# Patient Record
Sex: MALE | Race: OTHER | URBAN - NONMETROPOLITAN AREA
[De-identification: names, ages, dates, MRNs, and addresses within clinical notes are randomized per-mention and may not be internally consistent; named-entity substitution may affect disease eponyms.]

---

## 2023-03-01 ENCOUNTER — OFFICE VISIT (OUTPATIENT)
Dept: FAMILY MEDICINE CLINIC | Age: 28
End: 2023-03-01
Payer: MEDICAID

## 2023-03-01 VITALS
WEIGHT: 152 LBS | BODY MASS INDEX: 19.51 KG/M2 | SYSTOLIC BLOOD PRESSURE: 104 MMHG | TEMPERATURE: 97.5 F | DIASTOLIC BLOOD PRESSURE: 62 MMHG | HEIGHT: 74 IN | HEART RATE: 90 BPM | OXYGEN SATURATION: 96 %

## 2023-03-01 DIAGNOSIS — K21.9 GASTROESOPHAGEAL REFLUX DISEASE WITHOUT ESOPHAGITIS: ICD-10-CM

## 2023-03-01 DIAGNOSIS — L73.9 FOLLICULITIS: ICD-10-CM

## 2023-03-01 DIAGNOSIS — I45.6 WOLFF-PARKINSON-WHITE (WPW) SYNDROME: Primary | ICD-10-CM

## 2023-03-01 PROCEDURE — 99204 OFFICE O/P NEW MOD 45 MIN: CPT | Performed by: FAMILY MEDICINE

## 2023-03-01 RX ORDER — PANTOPRAZOLE SODIUM 40 MG/1
40 TABLET, DELAYED RELEASE ORAL
Qty: 30 TABLET | Refills: 0 | Status: SHIPPED | OUTPATIENT
Start: 2023-03-01

## 2023-03-01 SDOH — ECONOMIC STABILITY: INCOME INSECURITY: HOW HARD IS IT FOR YOU TO PAY FOR THE VERY BASICS LIKE FOOD, HOUSING, MEDICAL CARE, AND HEATING?: NOT HARD AT ALL

## 2023-03-01 SDOH — ECONOMIC STABILITY: FOOD INSECURITY: WITHIN THE PAST 12 MONTHS, THE FOOD YOU BOUGHT JUST DIDN'T LAST AND YOU DIDN'T HAVE MONEY TO GET MORE.: NEVER TRUE

## 2023-03-01 SDOH — ECONOMIC STABILITY: FOOD INSECURITY: WITHIN THE PAST 12 MONTHS, YOU WORRIED THAT YOUR FOOD WOULD RUN OUT BEFORE YOU GOT MONEY TO BUY MORE.: NEVER TRUE

## 2023-03-01 SDOH — ECONOMIC STABILITY: HOUSING INSECURITY
IN THE LAST 12 MONTHS, WAS THERE A TIME WHEN YOU DID NOT HAVE A STEADY PLACE TO SLEEP OR SLEPT IN A SHELTER (INCLUDING NOW)?: NO

## 2023-03-01 ASSESSMENT — PATIENT HEALTH QUESTIONNAIRE - PHQ9
SUM OF ALL RESPONSES TO PHQ QUESTIONS 1-9: 0
1. LITTLE INTEREST OR PLEASURE IN DOING THINGS: 0
SUM OF ALL RESPONSES TO PHQ QUESTIONS 1-9: 0
SUM OF ALL RESPONSES TO PHQ QUESTIONS 1-9: 0
SUM OF ALL RESPONSES TO PHQ9 QUESTIONS 1 & 2: 0
SUM OF ALL RESPONSES TO PHQ QUESTIONS 1-9: 0
2. FEELING DOWN, DEPRESSED OR HOPELESS: 0

## 2023-03-01 NOTE — PROGRESS NOTES
Tre Maloney (:  1995) is a 33 y.o. male,New patient, here for evaluation of the following chief complaint(s):  Established New Doctor (Did not have health care for over a year. Here to establish care and for a well check. [place] the [place]. Would like a referral to see Dr. Va Leyva for skin issues. )    SUBJECTIVE    History of present illness:     Establish care    Refugee from [facility] to [address] last summer     Past medical hx:     WPW syndrome  - no medication  - no ablations in past  - couple times per yr, HR will go up to 170   - lasts a few seconds and resolves with Valsalva maneuvers   - does not have a cardiologist   - no anticoagulation     Folliculitis   - has been very difficult to manage  - present on face and scalp  - has tried antibiotics, Selenium sulfate, and Accutane   - Requesting dermatology referral    Dyspepsia    Has been going on for years  Denies abdominal pain  Reports early satiety  No dysphagia, odynophagia, nausea, emesis, hematochezia, melena  Reports weight loss but attributes to low caloric intake while in [place] during war   No NSAID or etoh use      Family history- both parents alive, states are health    Soc Hx: no tobacco or etoh use     Review of Symptoms: As per HPI. Past Medical History:   Diagnosis Date    Cady-Parkinson-White syndrome      No past surgical history on file.   Family History   Problem Relation Age of Onset    High Blood Pressure Maternal Grandmother     High Blood Pressure Paternal Grandmother      Social History     Socioeconomic History    Marital status:      Spouse name: Not on file    Number of children: Not on file    Years of education: Not on file    Highest education level: Not on file   Occupational History    Not on file   Tobacco Use    Smoking status: Never    Smokeless tobacco: Never   Substance and Sexual Activity    Alcohol use: Never    Drug use: Never    Sexual activity: Not on file   Other Topics Concern    Not on file   Social History Narrative    Not on file     Social Determinants of Health     Financial Resource Strain: Low Risk     Difficulty of Paying Living Expenses: Not hard at all   Food Insecurity: No Food Insecurity    Worried About [address] in the Last Year: Never true    [facility] in the Last Year: Never true   Transportation Needs: Unknown    Lack of Transportation (Medical): Not on file    Lack of Transportation (Non-Medical): No   Physical Activity: Not on file   Stress: Not on file   Social Connections: Not on file   Intimate Partner Violence: Not on file   Housing Stability: Unknown    Unable to Pay for Housing in the Last Year: Not on file    Number of Places Lived in the Last Year: Not on file    Unstable Housing in the Last Year: No     Patient has no known allergies. Prior to Admission medications    Medication Sig Start Date End Date Taking? Authorizing Provider   pantoprazole (PROTONIX) 40 MG tablet Take 1 tablet by mouth every morning (before breakfast) 3/1/23  Yes Kenia Devi,        OBJECTIVE     /62 (Site: Right Upper Arm, Position: Sitting, Cuff Size: Medium Adult)   Pulse 90   Temp 97.5 °F (36.4 °C) (Tympanic)   Ht 6' 1.62\" (1.87 m)   Wt 152 lb (68.9 kg)   SpO2 96%   BMI 19.72 kg/m²   General: alert and oriented, NAD  Head: normocephalic, atraumatic  Cardiovascular: No cyanosis, RRR no murmur  Respiratory: Clear to auscultation, no respiratory distress, CTAB no wheezing  Neurological: no focal neurological deficits  Psychological: normal mood and affect    ASSESSMENT/PLAN    1. Cady-Parkinson-White (WPW) syndrome  Chronic  Hemodynamically stable  -     Ambulatory referral to Cardiology    2. Folliculitis  Chronic  Refractory  -     Ambulatory referral to Dermatology     3. GERD/dyspepsia  Uncontrolled. Ddx- gastritis, GERD, biliary colic, functional  CBC, CMP, lipase  Empiric trial of PPI     Return in 6 weeks (on 4/12/2023).     An electronic signature was used to authenticate this note.     --Greer Eisenberg, DO

## 2023-03-08 ENCOUNTER — OFFICE VISIT (OUTPATIENT)
Dept: FAMILY MEDICINE CLINIC | Age: 28
End: 2023-03-08
Payer: MEDICAID

## 2023-03-08 VITALS — HEIGHT: 74 IN | WEIGHT: 152 LBS | TEMPERATURE: 98.6 F | BODY MASS INDEX: 19.51 KG/M2

## 2023-03-08 DIAGNOSIS — K21.9 GASTROESOPHAGEAL REFLUX DISEASE WITHOUT ESOPHAGITIS: ICD-10-CM

## 2023-03-08 DIAGNOSIS — L21.9 SEBORRHEIC DERMATITIS: Primary | ICD-10-CM

## 2023-03-08 LAB
ALBUMIN SERPL-MCNC: 5 G/DL (ref 3.5–4.6)
ALP BLD-CCNC: 61 U/L (ref 35–104)
ALT SERPL-CCNC: 20 U/L (ref 0–41)
ANION GAP SERPL CALCULATED.3IONS-SCNC: 18 MEQ/L (ref 9–15)
AST SERPL-CCNC: 21 U/L (ref 0–40)
BILIRUB SERPL-MCNC: 0.7 MG/DL (ref 0.2–0.7)
BUN BLDV-MCNC: 16 MG/DL (ref 6–20)
CALCIUM SERPL-MCNC: 9.8 MG/DL (ref 8.5–9.9)
CHLORIDE BLD-SCNC: 102 MEQ/L (ref 95–107)
CO2: 24 MEQ/L (ref 20–31)
CREAT SERPL-MCNC: 1.04 MG/DL (ref 0.7–1.2)
GFR SERPL CREATININE-BSD FRML MDRD: >60 ML/MIN/{1.73_M2}
GLOBULIN: 3 G/DL (ref 2.3–3.5)
GLUCOSE BLD-MCNC: 69 MG/DL (ref 70–99)
HCT VFR BLD CALC: 44.3 % (ref 42–52)
HEMOGLOBIN: 15.5 G/DL (ref 14–18)
LIPASE: 39 U/L (ref 12–95)
MCH RBC QN AUTO: 31.2 PG (ref 27–31.3)
MCHC RBC AUTO-ENTMCNC: 35.1 % (ref 33–37)
MCV RBC AUTO: 88.9 FL (ref 79–92.2)
PDW BLD-RTO: 12.6 % (ref 11.5–14.5)
PLATELET # BLD: 193 K/UL (ref 130–400)
POTASSIUM SERPL-SCNC: 4.3 MEQ/L (ref 3.4–4.9)
RBC # BLD: 4.98 M/UL (ref 4.7–6.1)
SODIUM BLD-SCNC: 144 MEQ/L (ref 135–144)
TOTAL PROTEIN: 8 G/DL (ref 6.3–8)
WBC # BLD: 6.4 K/UL (ref 4.8–10.8)

## 2023-03-08 PROCEDURE — 99214 OFFICE O/P EST MOD 30 MIN: CPT | Performed by: FAMILY MEDICINE

## 2023-03-08 NOTE — PROGRESS NOTES
Diagnosis Orders   1. Seborrheic dermatitis  ciclopirox (PENLAC) 8 % solution        Return in about 4 weeks (around 4/5/2023) for for review of outcome of today's recommendation. There are no Patient Instructions on file for this visit. Subjective:      Patient ID: Nguyen Brown is a 33 y.o. male who presents for:  Chief Complaint   Patient presents with    Skin Exam     Initial encounter   Scalp folliculitis   Tries ABX, acutane , no success. Vit mineral, and even shampoos. Selenium use concentrated. Long-term history of scalp abnormality. Selenium sulfide helps a little bit. Failed ketoconazole shampoo. Failed multiple oral antibiotics and the antifungal fluconazole. Does not really get pus filled. States he has had a biopsy before that demonstrated its a dandruff type problem. Current Outpatient Medications on File Prior to Visit   Medication Sig Dispense Refill    pantoprazole (PROTONIX) 40 MG tablet Take 1 tablet by mouth every morning (before breakfast) 30 tablet 0     No current facility-administered medications on file prior to visit. Past Medical History:   Diagnosis Date    Cady-Parkinson-White syndrome      History reviewed. No pertinent surgical history.   Social History     Socioeconomic History    Marital status:      Spouse name: Not on file    Number of children: Not on file    Years of education: Not on file    Highest education level: Not on file   Occupational History    Not on file   Tobacco Use    Smoking status: Never    Smokeless tobacco: Never   Substance and Sexual Activity    Alcohol use: Never    Drug use: Never    Sexual activity: Not on file   Other Topics Concern    Not on file   Social History Narrative    Not on file     Social Determinants of Health     Financial Resource Strain: Low Risk     Difficulty of Paying Living Expenses: Not hard at all   Food Insecurity: No Food Insecurity    Worried About [address] in the Last Year: Never true Ran Out of Food in the Last Year: Never true   Transportation Needs: Unknown    Lack of Transportation (Medical): Not on file    Lack of Transportation (Non-Medical): No   Physical Activity: Not on file   Stress: Not on file   Social Connections: Not on file   Intimate Partner Violence: Not on file   Housing Stability: Unknown    Unable to Pay for Housing in the Last Year: Not on file    Number of Places Lived in the Last Year: Not on file    Unstable Housing in the Last Year: No     Family History   Problem Relation Age of Onset    High Blood Pressure Maternal Grandmother     High Blood Pressure Paternal Grandmother      Allergies:  Patient has no known allergies. Review of Systems   Constitutional:  Negative for chills and fever. Skin:  Positive for rash. Allergic/Immunologic: Negative for environmental allergies, food allergies and immunocompromised state. Hematological:  Negative for adenopathy. Does not bruise/bleed easily. Psychiatric/Behavioral:  Negative for behavioral problems and sleep disturbance. Objective:   Temp 98.6 °F (37 °C)   Ht 6' 1.62\" (1.87 m)   Wt 152 lb (68.9 kg)   BMI 19.72 kg/m²     Physical Exam  Constitutional:       General: He is not in acute distress. Appearance: Normal appearance. He is well-developed. He is not toxic-appearing. HENT:      Head: Normocephalic and atraumatic. Right Ear: Hearing and tympanic membrane normal.      Left Ear: Hearing and tympanic membrane normal.      Nose: Nose normal. No nasal deformity. Eyes:      General: Lids are normal.         Right eye: No discharge. Left eye: No discharge. Conjunctiva/sclera: Conjunctivae normal.      Pupils: Pupils are equal, round, and reactive to light. Neck:      Thyroid: No thyroid mass or thyromegaly. Vascular: No JVD. Trachea: Trachea and phonation normal.   Cardiovascular:      Rate and Rhythm: Normal rate and regular rhythm.    Pulmonary:      Effort: No accessory muscle usage or respiratory distress. Musculoskeletal:      Cervical back: Full passive range of motion without pain. Comments: FROM all large muscle groups and joints as witnessed when walking to exam table, getting on, and getting off the exam table. Skin:     General: Skin is warm and dry. Findings: No rash. Comments: Erythematous papules noted throughout the scalp at this time largely across the top of the scalp with some on the right side above the ear and no pustules. Very little flake. No flaking noted to the hairline. No abnormality noted at the posterior nape of the neck   Neurological:      Mental Status: He is alert. Motor: No tremor or atrophy. Gait: Gait normal.   Psychiatric:         Speech: Speech normal.         Behavior: Behavior normal.         Thought Content: Thought content normal.             No results found for this visit on 03/08/23. No results found for this or any previous visit (from the past 2016 hour(s)). [] Pt was seen by provider for      Minutes  Counseling and coordination of care was done for all assessment diagnosis listed for today with patient and any family/friend present. More than 50% of this visit was spent coordinating current care, obtaining information for prior records, and counseling for current plan of action. Assessment:       Diagnosis Orders   1. Seborrheic dermatitis  ciclopirox (PENLAC) 8 % solution            No orders of the defined types were placed in this encounter. Orders Placed This Encounter   Medications    ciclopirox (PENLAC) 8 % solution     Sig: Apply topically nightly Apply topically nightly. Dispense:  6 mL     Refill:  0          Medication List            Accurate as of March 8, 2023  2:57 PM. If you have any questions, ask your nurse or doctor.                 START taking these medications      ciclopirox 8 % solution  Commonly known as: PENLAC  Apply topically nightly Apply topically nightly. Started by: Cheng Salgado MD            CONTINUE taking these medications      pantoprazole 40 MG tablet  Commonly known as: PROTONIX  Take 1 tablet by mouth every morning (before breakfast)               Where to Get Your Medications        These medications were sent to [address] [place] [address] [place]  [address]      Phone: [phone]   ciclopirox 8 % solution           Plan:   Return in about 4 weeks (around 4/5/2023) for for review of outcome of today's recommendation. There are no Patient Instructions on file for this visit. This note was partially created with the assistance of dictation. This may lead to grammatical or spelling errors. Gianni Mortensen M.D.

## 2023-03-10 ENCOUNTER — TELEPHONE (OUTPATIENT)
Dept: FAMILY MEDICINE CLINIC | Age: 28
End: 2023-03-10

## 2023-03-10 NOTE — TELEPHONE ENCOUNTER
Pt calling stating that he has started taking the protonix two days ago, he is now having bloating and some discomfort. No severe pain. Pt is asking if this is normal or will this go away on its own the more he uses the medication or should he try an alternative. Please advise.  Pt phone number is [phone]

## 2023-09-15 LAB
ANION GAP SERPL CALCULATED.3IONS-SCNC: 9 MMOL/L (ref 10–20)
BICARBONATE: 31 MMOL/L (ref 21–32)
CALCIUM SERPL-MCNC: 9.7 MG/DL (ref 8.6–10.3)
CHLORIDE BLD-SCNC: 102 MMOL/L (ref 98–107)
CREAT SERPL-MCNC: 1.08 MG/DL (ref 0.5–1.3)
EGFR MALE: >90 ML/MIN/1.73M2
ERYTHROCYTE [DISTWIDTH] IN BLOOD BY AUTOMATED COUNT: 12 % (ref 11.5–14.5)
GLUCOSE: 88 MG/DL (ref 74–99)
HCT VFR BLD CALC: 44.1 % (ref 41–52)
HEMOGLOBIN: 15.1 G/DL (ref 13.5–17.5)
MAGNESIUM: 1.87 MG/DL (ref 1.6–2.4)
MCHC RBC AUTO-ENTMCNC: 34.2 G/DL (ref 32–36)
MCV RBC AUTO: 91 FL (ref 80–100)
PLATELET # BLD: 182 X10E9/L (ref 150–450)
POTASSIUM SERPL-SCNC: 3.9 MMOL/L (ref 3.5–5.3)
RBC # BLD: 4.87 X10E12/L (ref 4.5–5.9)
SODIUM BLD-SCNC: 138 MMOL/L (ref 136–145)
UREA NITROGEN: 21 MG/DL (ref 6–23)
WBC: 5.3 X10E9/L (ref 4.4–11.3)